# Patient Record
(demographics unavailable — no encounter records)

---

## 2025-02-11 NOTE — HISTORY OF PRESENT ILLNESS
[TextBox_4] : Ms. SALINAS  is a 57 year old female with a history of presenting to the office today for a follow-up pulmonary evaluation. Her chief complaint is  -she notes feeling generally well -she notes bowels are regular -she notes poor quality of sleep -she notes sleeping for 6 hours -she notes PND in the morning  -she denies exercising -she notes appetite is stable -she notes diet is good -she notes sinuses are active   -she denies any headaches, nausea, emesis, fever, chills, sweats, chest pain, chest pressure, coughing, wheezing, palpitations, diarrhea, constipation, dysphagia, vertigo, arthralgias, myalgias, leg swelling, itchy eyes, itchy ears, heartburn, reflux, or sour taste in the mouth.

## 2025-02-11 NOTE — REASON FOR VISIT
[Follow-Up] : a follow-up visit [TextBox_44] : SOB, Mild Intermittent Asthma, Allergies/Sinus, GERD, Snoring ?DENIZ, Lung CA Screening Concerns (Family Hx mother/father)

## 2025-02-11 NOTE — ADDENDUM
[FreeTextEntry1] : Documented by Stephane Camarillo acting as a scribe for Dr. Fran Meneses on 02/11/2025. All medical record entries made by the Scribe were at my, Dr. Fran Meneses's, direction and personally dictated by me on 02/11/2025. I have reviewed the chart and agree that the record accurately reflects my personal performance of the history, physical exam, assessment and plan. I have also personally directed, reviewed, and agree with the discharge instructions.

## 2025-02-11 NOTE — ASSESSMENT
[FreeTextEntry1] : Ms. SALINAS  is a 57 year old female with a history of Covid-19 x3 (2020, 12/2022, 2023), celiac disease, chicken pox (as a child), diverticulosis, hemorrhoid, Hashimoto's thyroiditis, colonic polyp, prediabetes, low vitamin D, mild intermittent asthma, second hand smoker exposure, strong family Hx of lung cancer (small cell and non small cell) SOB, Mild Intermittent Asthma, Allergies/Sinus, GERD, Snoring ?DENIZ, Lung CA Screening Concerns (Family Hx mother/father) - Dx LPR (ENT) - PNDing is #1 issue    Her shortness of breath is multifactorial due to: -poor mechanics of breathing -out of shape -over weight -pulmonary disease   -Asthma   problem 1: Mild Intermittent Asthma -add Airsupra 2 puffs q6H prn -Inhaler technique reviewed as well as oral hygiene techniques reviewed with patient. Avoidance of cold air, extremes of temperature, rescue inhaler should be used before exercise. Order of medication reviewed with patient. Recommended use of a cool mist humidifier in the bedroom. - Asthma is believed to be caused by inherited (genetic) and environmental factor, but its exact cause is unknown.  - Asthma may be triggered by allergens, lung infections, or irritants in the air. Asthma triggers are different for each person   problem 2: Allergies / Sinus -add Olopatadine 0.6% 1 sniff BID -s/p blood work to include: asthma panel, food IgE panel, IgE level, eosinophil level, vitamin D level (WNL) -recommended a saline rinse -recommended Xlear nasal spray OTC  Environmental measures for allergies were encouraged including mattress and pillow covers, air purifier, and environmental controls.   Problem 3: GERD (LPR) -recommended Reflux Gourmet OTC -continue Omeprazole 40 mg QAM, pre-meal -Rule of 2s: avoid eating too much, eating too late, eating too spicy, eating two hours before bed. -Things to avoid including overeating, spicy foods, tight clothing, eating within three hours of bed, this list is not all inclusive. -For treatment of reflux, possible options discussed including diet control, H2 blockers, PPIs, as well as coating motility agents discussed as treatment options. Timing of meals and proximity of last meal to sleep were discussed. If symptoms persist, a formal gastrointestinal evaluation is needed.   Problem 4: ?DENIZ (Risk factors : elevated Mallampati class, snoring) ([place]) -get home sleep study  -Sleep apnea is associated with adverse clinical consequences which can affect most organ systems. Cardiovascular disease risk includes arrhythmias, atrial fibrillation, hypertension, coronary artery disease, and stroke. Metabolic disorders include diabetes type 2, non-alcoholic fatty liver disease. Mood disorder especially depression; and cognitive decline especially in the elderly. Associations with chronic reflux/Karl's esophagus some but now all inclusive. -Reasons include arousal consistent with hypopnea; respiratory events most prominent in REM sleep or supine position; therefore sleep staging and body position are important for accurate diagnosis and estimation of AHI.   Problem 5: Lung CA Screening Concerns (Strong family Hx - mother non-small cell carcinoma/father small cell carcinoma) -s/p low dose CT - next 6/2025 The American Cancer Society now recommends that individuals aged 50 to 80 years who currently smoke, formerly smoked. and are at high risk for lung cancer because of a greater than 20 pack-year history of cigarette smoking undergo annual lung cancer screening with LDCT. The recommendations eliminated years since quitting as a criterium for evaluation for lung cancer screening.   problem 6: cardiac  -recommended to continue to follow up with Cardiologist if needed   problem 7: poor breathing mechanics -Proper breathing techniques were reviewed with an emphasis of exhalation. Patient instructed to breath in for 1 second and out for four seconds. Patient was encouraged to not talk while walking.   problem 8: overweight / out of shape -recommended Berberine Synergy OTC -Weight loss, exercise, and diet control were discussed and are highly encouraged. Treatment options are given such as, aqua therapy, and contacting a nutritionist. Recommended to use the elliptical, stationary bike, less use of treadmill.   problem 9: health maintenance -TDaP given in office 08/13/2024 -recommended yearly flu shot after October 15 -recommended strep pneumonia vaccines: Prevnar-20 vaccine, followed by Pneumo vaccine 23 one year following after 65 years old. -recommended early intervention for Upper Respiratory Infections (URIs) -recommended regular osteoporosis evaluations -recommended early dermatological evaluations -recommended after the age of 50 to the age of 70, colonoscopy every 5 years   F/U in 6-8 weeks. She is encouraged to call with any changes, concerns, or questions

## 2025-02-11 NOTE — PROCEDURE
[FreeTextEntry1] : Full PFT reveals normal flows; FEV1 was 3.3 L which is 126% of predicted; normal lung volumes; normal diffusion at 17.64, which is 84% of predicted; normal flow volume loop. PFTs were performed to evaluate for SOB  FENO was 16; a normal value being less than 25 Fractional exhaled nitric oxide (FENO) is regarded as a simple, noninvasive method for assessing eosinophilic airway inflammation. Produced by a variety of cells within the lung, nitric oxide (NO) concentrations are generally low in healthy individuals. However, high concentrations of NO appear to be involved in nonspecific host defense mechanisms and chronic inflammatory diseases such as asthma. The American Thoracic Society (ATS) therefore has recommended using FENO to aid in the diagnosis and monitoring of eosinophilic airway inflammation and asthma, and for identifying steroid responsive individuals whose chronic respiratory symptoms may be caused by airway inflammation.   The American Thoracic Society (ATS) strongly recommends the use of FeNO measurement to aid in the assessment, management, and long-term monitoring of asthma. In their 2011 clinical practice guideline, the ATS emphasizes the importance of using FeNO.

## 2025-02-11 NOTE — PHYSICAL EXAM
[No Acute Distress] : no acute distress [Normal Oropharynx] : normal oropharynx [III] : Mallampati Class: III [Normal Appearance] : normal appearance [No Neck Mass] : no neck mass [Normal Rate/Rhythm] : normal rate/rhythm [Murmur ___ / 6] : murmur [unfilled] / 6 [Normal S1, S2] : normal s1, s2 [No Resp Distress] : no resp distress [Clear to Auscultation Bilaterally] : clear to auscultation bilaterally [No Abnormalities] : no abnormalities [Benign] : benign [Normal Gait] : normal gait [No Clubbing] : no clubbing [No Cyanosis] : no cyanosis [No Edema] : no edema [FROM] : FROM [Normal Color/ Pigmentation] : normal color/ pigmentation [No Focal Deficits] : no focal deficits [Oriented x3] : oriented x3 [Normal Affect] : normal affect [TextBox_2] : OW [TextBox_54] : 2/6 systolic murmur [TextBox_68] : I:E ratio 1:3; clear

## 2025-06-04 NOTE — COUNSELING
[Benefits of weight loss discussed] : Benefits of weight loss discussed [Encouraged to increase physical activity] : Encouraged to increase physical activity [Needs reinforcement, provided] : Patient needs reinforcement on understanding of disease, goals and obesity follow-up plan; reinforcement was provided [FreeTextEntry4] : 15

## 2025-06-04 NOTE — ASSESSMENT
[FreeTextEntry1] : # HM f/u AV labs Due for breast and cervical ca screening, referred to GYN Up to date w/ colon ca screen  # Celiac disease Continue avoiding gluten products  # Obesity, preDM Discussed diet and exercise in order to lose weight. Increase raw fruits and vegetables, decrease processed foods, fatty foods, sugars. Recommended patient eliminate soda from diet entirely. Increase physical activity Discussed referring to nutritionist, pt has one she follows already f/u A1c, lipid, TFT Duration of discussion 15 min.  # Asthma, pulm nodules Doesn't use inhalers, not needed Has CT chest scheduled Cont following w pulm Dr. Meneses  # Hx Hashimoto's f/u TFTs  f/u in 1 year or PRN   Visit conducted as part of ongoing, longitudinal medical care for patient's medical and other issues. [Vaccines Reviewed] : Immunizations reviewed today. Please see immunization details in the vaccine log within the immunization flowsheet.

## 2025-06-04 NOTE — PHYSICAL EXAM
PHYSICAL EXAMINATION:  Vital Signs Last 24 Hrs  T(C): 37.4 (27 Sep 2018 12:15), Max: 37.4 (27 Sep 2018 11:56)  T(F): 99.3 (27 Sep 2018 12:15), Max: 99.4 (27 Sep 2018 11:56)  HR: 90 (27 Sep 2018 12:15) (86 - 90)  BP: 118/60 (27 Sep 2018 12:15) (104/66 - 118/60)  BP(mean): --  RR: 16 (27 Sep 2018 12:15) (16 - 17)  SpO2: 100% (27 Sep 2018 12:15) (99% - 100%)  CAPILLARY BLOOD GLUCOSE            GENERAL: NAD, well-groomed,/  cachexia  HEAD:  atraumatic, normocephalic  EYES: sclera anicteric  ENMT: mucous membranes moist  NECK: supple, No JVD  CHEST/LUNG: clear to auscultation bilaterally;    no      rales   ,   no rhonchi,   HEART: normal S1, S2  ABDOMEN: BS+, soft, ND, NT / colostomy  EXTREMITIES:    no    edema    b/l LEs  NEURO: awake, ,     moves all extremities  SKIN: no     rash no [Normal TMs] : both tympanic membranes were normal [No Lymphadenopathy] : no lymphadenopathy [Supple] : supple [Normal] : normal rate, regular rhythm, normal S1 and S2 and no murmur heard [No Edema] : there was no peripheral edema [No Extremity Clubbing/Cyanosis] : no extremity clubbing/cyanosis [Soft] : abdomen soft [Non Tender] : non-tender [Non-distended] : non-distended [No Masses] : no abdominal mass palpated [Normal Supraclavicular Nodes] : no supraclavicular lymphadenopathy [Normal Anterior Cervical Nodes] : no anterior cervical lymphadenopathy [Normal Gait] : normal gait [Speech Grossly Normal] : speech grossly normal [Normal Affect] : the affect was normal [Alert and Oriented x3] : oriented to person, place, and time [Normal Mood] : the mood was normal [Normal Insight/Judgement] : insight and judgment were intact

## 2025-06-04 NOTE — REVIEW OF SYSTEMS
[Anxiety] : no anxiety [Depression] : no depression [Negative] : Gastrointestinal [FreeTextEntry9] : chronic R sided neck pain

## 2025-06-04 NOTE — HEALTH RISK ASSESSMENT
[Yes] : Yes [2 - 3 times a week (3 pts)] : 2 - 3  times a week (3 points) [1 or 2 (0 pts)] : 1 or 2 (0 points) [Never (0 pts)] : Never (0 points) [No] : In the past 12 months have you used drugs other than those required for medical reasons? No [0] : 2) Feeling down, depressed, or hopeless: Not at all (0) [PHQ-2 Negative - No further assessment needed] : PHQ-2 Negative - No further assessment needed [Never] : Never [Patient reported mammogram was normal] : Patient reported mammogram was normal [Patient reported PAP Smear was normal] : Patient reported PAP Smear was normal [Patient reported colonoscopy was normal] : Patient reported colonoscopy was normal [HIV test declined] : HIV test declined [Hepatitis C test declined] : Hepatitis C test declined [Audit-CScore] : 3 [de-identified] : see above [de-identified] : not exercising routinely, but is active [[id number]] : 0 [MammogramDate] : 06/24 [MammogramComments] : needs new GYN [PapSmearDate] : 06/24 [BoneDensityComments] : never went [ColonoscopyDate] : 05/24 [ColonoscopyComments] : Repeat in 5 years may 2029

## 2025-06-04 NOTE — HISTORY OF PRESENT ILLNESS
[FreeTextEntry1] : CPE [de-identified] : Pt comes in for CPE.  Asthma, pulm nodules: going for CT later this week. Pt's father had small cell lung ca (smoker) and her mother had NSCLC. Following w/ Dr. Zaira holbrook for this. Not using inhalers for asthma, very mild.  Celiac, GERD: sticks to gluten free diet. Doesn't use PPI much.  Obesity, preDM: wants to have thyroid levels checked. Morning usually has some rice crackers w/ PB&J and glass of milk, lunch is sandwich or salad or leftovers w/ apple or tangerine, dinner is meat, starch, or vegetable, sometimes icecream. Drinks soda small amount (small can) per week.  Doesn't drink juice. Drinks mostly water throughout the day. Doesn't drink coffee.  Hashimoto's: 2 sisters have thyroid disease

## 2025-06-04 NOTE — HEALTH RISK ASSESSMENT
[Yes] : Yes [2 - 3 times a week (3 pts)] : 2 - 3  times a week (3 points) [1 or 2 (0 pts)] : 1 or 2 (0 points) [Never (0 pts)] : Never (0 points) [No] : In the past 12 months have you used drugs other than those required for medical reasons? No [0] : 2) Feeling down, depressed, or hopeless: Not at all (0) [PHQ-2 Negative - No further assessment needed] : PHQ-2 Negative - No further assessment needed [Never] : Never [Patient reported mammogram was normal] : Patient reported mammogram was normal [Patient reported PAP Smear was normal] : Patient reported PAP Smear was normal [Patient reported colonoscopy was normal] : Patient reported colonoscopy was normal [HIV test declined] : HIV test declined [Hepatitis C test declined] : Hepatitis C test declined [Audit-CScore] : 3 [de-identified] : see above [de-identified] : not exercising routinely, but is active [[id number]] : 0 [MammogramDate] : 06/24 [MammogramComments] : needs new GYN [PapSmearDate] : 06/24 [BoneDensityComments] : never went [ColonoscopyDate] : 05/24 [ColonoscopyComments] : Repeat in 5 years may 2029

## 2025-06-04 NOTE — HISTORY OF PRESENT ILLNESS
[FreeTextEntry1] : CPE [de-identified] : Pt comes in for CPE.  Asthma, pulm nodules: going for CT later this week. Pt's father had small cell lung ca (smoker) and her mother had NSCLC. Following w/ Dr. Zaira holbrook for this. Not using inhalers for asthma, very mild.  Celiac, GERD: sticks to gluten free diet. Doesn't use PPI much.  Obesity, preDM: wants to have thyroid levels checked. Morning usually has some rice crackers w/ PB&J and glass of milk, lunch is sandwich or salad or leftovers w/ apple or tangerine, dinner is meat, starch, or vegetable, sometimes icecream. Drinks soda small amount (small can) per week.  Doesn't drink juice. Drinks mostly water throughout the day. Doesn't drink coffee.  Hashimoto's: 2 sisters have thyroid disease